# Patient Record
Sex: MALE | Race: WHITE | NOT HISPANIC OR LATINO | URBAN - METROPOLITAN AREA
[De-identification: names, ages, dates, MRNs, and addresses within clinical notes are randomized per-mention and may not be internally consistent; named-entity substitution may affect disease eponyms.]

---

## 2020-10-21 ENCOUNTER — OFFICE VISIT (OUTPATIENT)
Dept: SURGERY | Facility: CLINIC | Age: 60
End: 2020-10-21
Payer: COMMERCIAL

## 2020-10-21 VITALS
SYSTOLIC BLOOD PRESSURE: 140 MMHG | DIASTOLIC BLOOD PRESSURE: 80 MMHG | HEIGHT: 71 IN | WEIGHT: 185 LBS | BODY MASS INDEX: 25.9 KG/M2

## 2020-10-21 DIAGNOSIS — K64.8 INTERNAL HEMORRHOID, BLEEDING: Primary | ICD-10-CM

## 2020-10-21 PROCEDURE — 99204 OFFICE O/P NEW MOD 45 MIN: CPT | Mod: 25 | Performed by: SURGERY

## 2020-10-21 PROCEDURE — 46500 INJECTION INTO HEMORRHOID(S): CPT | Performed by: SURGERY

## 2020-10-21 RX ORDER — BUTYROSPERMUM PARKII(SHEA BUTTER), SIMMONDSIA CHINENSIS (JOJOBA) SEED OIL, ALOE BARBADENSIS LEAF EXTRACT .01; 1; 3.5 G/100G; G/100G; G/100G
250 LIQUID TOPICAL 2 TIMES DAILY
COMMUNITY

## 2020-10-21 RX ORDER — FERROUS SULFATE 325(65) MG
65 TABLET ORAL
COMMUNITY

## 2020-10-21 RX ORDER — VIT C/E/ZN/COPPR/LUTEIN/ZEAXAN 250MG-90MG
CAPSULE ORAL DAILY
COMMUNITY

## 2020-10-21 NOTE — PROCEDURES
Procedures  The patient had large friable internal hemorrhoids that required office based therapy.  Verbal consent was obtained.  No prep was necessary.  First a digital rectal exam and then anoscopy was performed.  Injection sclerotherapy was then performed using 5% phenol in olive oil through an 18 gauge spinal needle.  The patient tolerated it well and was given instructions to return as needed.

## 2020-10-21 NOTE — PATIENT INSTRUCTIONS
Injection Sclerotherapy for Hemorrhoids    Treatment Overview    Injection sclerotherapy is a medical procedure used to treat bulging or bleeding internal hemorrhoids. This fixative procedure uses a chemical that scars the tissues and cuts off the hemorrhoids' blood supply.    The doctor injects a chemical called “phenol” mixed in olive oil into the veins within a hemorrhoid. This chemical causes the vein to harden and the hemorrhoid tissue to die. A scar forms in place of the hemorrhoid on the wall of the anal canal. The scar tissue, which is firm and thick, holds nearby tissue and veins in place so they don't bulge into the anal canal.    What To Expect After Treatment    ? a small to moderate amount of bleeding may occur for up to 7 days  ? you may feel an ache or soreness for 24-48 hours  ? you may feel full or like you have to defecate for 24 hours    Taking Care of Yourself    ? sitting in a warm tub or sitz bath may relieve the aching sensation  ? tylenol or ibuprofen should be adequate to control the discomfort  ? try to maintain a high fiber diet in order to reduce straining with defecation    Adverse Reactions - please call Dr. Webster if any of these occur    ? fever  ? chills  ? aching joints  ? pelvic pain  ? pelvic inflammation  ? rectal inflammation or infection      These reactions are rare and may occur in 1 in 500 cases.  Most are not serious, are self-limiting, and require no medical intervention.

## 2020-10-21 NOTE — PROGRESS NOTES
Chief Complaint:   Chief Complaint   Patient presents with   • Rectal Bleeding     hemorrhoids   .    HPI     Patient is a 59 y.o. male who presents with the following:  Pt has int hems via colonoscopy - few episodes of bleeding - few years ago the bleeding lasted a while - sees Dr. Morris - took iron - bleeding stopped - recently another episode bleed and more anemia    Years ago HGB was 10.9    recemntly 11.7 - back on irone    Bleeding has stopped - today a drop or two    Last scope was 1.5 y ago - told 10y    ANORECTAL SYMPTOM GRID    Timing / Duration - overall 10y on and off - 3y ago bad - this year bad  Bleeding - three weeks recently - every day - can stream into bowl - can drip into bowl - has to wait it out - stop it - no pad - not messing up clothes -   Pain - no sharp pain   External tissue - no  BM Frequency - daily  Dermatitis - no  Chemicals - always wipes -   Moderating Factors - maybe diet - gassy / consstipation  Last Colonoscopy - 1.5 -              Medical History:   Past Medical History:   Diagnosis Date   • Lipid disorder        Surgical History:   History reviewed. No pertinent surgical history.    Social History:   Social History     Socioeconomic History   • Marital status:      Spouse name: Not on file   • Number of children: Not on file   • Years of education: Not on file   • Highest education level: Not on file   Occupational History   • Not on file   Social Needs   • Financial resource strain: Not on file   • Food insecurity     Worry: Not on file     Inability: Not on file   • Transportation needs     Medical: Not on file     Non-medical: Not on file   Tobacco Use   • Smoking status: Never Smoker   • Smokeless tobacco: Never Used   Substance and Sexual Activity   • Alcohol use: Yes   • Drug use: Never   • Sexual activity: Defer   Lifestyle   • Physical activity     Days per week: Not on file     Minutes per session: Not on file   • Stress: Not on file   Relationships   • Social  connections     Talks on phone: Not on file     Gets together: Not on file     Attends Christian service: Not on file     Active member of club or organization: Not on file     Attends meetings of clubs or organizations: Not on file     Relationship status: Not on file   • Intimate partner violence     Fear of current or ex partner: Not on file     Emotionally abused: Not on file     Physically abused: Not on file     Forced sexual activity: Not on file   Other Topics Concern   • Not on file   Social History Narrative   • Not on file       Family History:   History reviewed. No pertinent family history.    Allergies:   Patient has no known allergies.    Current Medications:      Current Outpatient Medications:   •  cyanocobalamin, vitamin B-12, (VITAMIN B-12) 2,500 mcg tablet, sublingual, Place under the tongue daily., Disp: , Rfl:   •  ferrous sulfate 325 mg (65 mg iron) tablet, Take 65 mg by mouth daily with breakfast., Disp: , Rfl:   •  MEN'S MULTI-VITAMIN ORAL, Take by mouth., Disp: , Rfl:   •  saccharomyces boulardii (FLORASTOR) 250 mg capsule, Take 250 mg by mouth 2 (two) times a day., Disp: , Rfl:     Review of Systems  All 14 systems reviewed and the findings are not pertinent to the current problem.    Objective     Vital Signs  Vitals:    10/21/20 1603   BP: 140/80     Body mass index is 25.8 kg/m².      Physicial Exam  General Appearance:  Well developed.  Well nourished.  In no acute distress. Patient was not obese.  Head:  Posture of the head was normal.  Neck:  External features of the neck was normal.  Trachea:  Showed no abnormalities.  Trachea midline.  Extraocular Movements:  Normal.  Pupils:  Reactive to light.   Not deformed.  Oral Cavity:  Mixed dentition was not observed.  Tongue was midline.  Peripheral edema:  Not present.  Genitalia:  Normal.    Anorectal Examination:    No pilonidal sinus was observed.   No pilonidal cyst was observed.   Perianal skin was not erythematous.  No perianal wart  was observed.  No anal fissure was observed.   Anus did not have a fistula.   Anal sphincter tone was normal.    The patient had large, friable, grade three internal hemorrhoids.  Specifics:  Very large hems R post is huge - all injected.    No external anal skin tags were observed.   Anus had no mass.  Rectum:  No rectal abscess was observed.   Rectal prolapse was not observed.   No rectal fistula was observed.   Rectal exam was not tender.   No rectal fluctuance was observed.  Rectal exam showed no mass.   Normal dave-anal skin with no lesions or dermatitis    Other Exam:  Musculoskeletal System:  No gross defecits or defects of the upper or lower extremities.  No cyanosis of the fingers.  Lumbar / Lumbosacral Spine:  Lumbar/lumbosacral spine exhibited no abnormalities.  Bilateral thighs:   Posterior aspect was not swollen.   Posterior aspect showed no induration.   Posterior aspect was not erythematous.   Posterior aspect was not warm.   No mass on the posterior aspect.  Functional Exam:   General/bilateral:  Mobility was not limited.  Neurological:   No confusion was observed.   No disorientation was observed.  Speech:  Normal.  Motor:  No tremor was seen.  Balance:  Normal.  Gait And Stance:  Normal.  Psychiatric:  Mood:  Euthymic.  Affect:  Normal.  Skin:  No clubbing of the fingernails.  Normal upper and lower extremity skin exam.            Problem List Items Addressed This Visit     Internal hemorrhoid, bleeding - Primary     He has intermittent flares of very intense rectal bleeding that goes on for weeks and tends to drop his hemoglobin.  On exam he has very large internal hemorrhoids.  I injected him with phenol today to prevent any future attacks of intense bleeding.                   Sincere Webster MD 10/21/2020 4:41 PM

## 2020-10-21 NOTE — ASSESSMENT & PLAN NOTE
He has intermittent flares of very intense rectal bleeding that goes on for weeks and tends to drop his hemoglobin.  On exam he has very large internal hemorrhoids.  I injected him with phenol today to prevent any future attacks of intense bleeding.

## 2020-10-21 NOTE — LETTER
October 21, 2020     Magi Morris MD  [id number] [facility] [id number]    Patient: Chema Walker  YOB: 1960  Date of Visit: 10/21/2020      Dear Dr. Morris:    Thank you for referring Chema Walker to me for evaluation. Below are my notes for this consultation.    If you have questions, please do not hesitate to call me. I look forward to following your patient along with you.         Sincerely,        Sincere Webster MD        CC: No Recipients  Sincere Webster MD  10/21/2020  4:41 PM  Sign when Signing Visit      Chief Complaint:   Chief Complaint   Patient presents with   • Rectal Bleeding     hemorrhoids   .    HPI     Patient is a 59 y.o. male who presents with the following:  Pt has int hems via colonoscopy - few episodes of bleeding - few years ago the bleeding lasted a while - sees Dr. Morris - took iron - bleeding stopped - recently another episode bleed and more anemia    Years ago HGB was 10.9    recemntly 11.7 - back on irone    Bleeding has stopped - today a drop or two    Last scope was 1.5 y ago - told 10y    ANORECTAL SYMPTOM GRID    Timing / Duration - overall 10y on and off - 3y ago bad - this year bad  Bleeding - three weeks recently - every day - can stream into bowl - can drip into bowl - has to wait it out - stop it - no pad - not messing up clothes -   Pain - no sharp pain   External tissue - no  BM Frequency - daily  Dermatitis - no  Chemicals - always wipes -   Moderating Factors - maybe diet - gassy / consstipation  Last Colonoscopy - 1.5 -              Medical History:   Past Medical History:   Diagnosis Date   • Lipid disorder        Surgical History:   History reviewed. No pertinent surgical history.    Social History:   Social History     Socioeconomic History   • Marital status:      Spouse name: Not on file   • Number of children: Not on file   • Years of education: Not on file   • Highest education level: Not on file    Occupational History   • Not on file   Social Needs   • Financial resource strain: Not on file   • Food insecurity     Worry: Not on file     Inability: Not on file   • Transportation needs     Medical: Not on file     Non-medical: Not on file   Tobacco Use   • Smoking status: Never Smoker   • Smokeless tobacco: Never Used   Substance and Sexual Activity   • Alcohol use: Yes   • Drug use: Never   • Sexual activity: Defer   Lifestyle   • Physical activity     Days per week: Not on file     Minutes per session: Not on file   • Stress: Not on file   Relationships   • Social connections     Talks on phone: Not on file     Gets together: Not on file     Attends Spiritism service: Not on file     Active member of club or organization: Not on file     Attends meetings of clubs or organizations: Not on file     Relationship status: Not on file   • Intimate partner violence     Fear of current or ex partner: Not on file     Emotionally abused: Not on file     Physically abused: Not on file     Forced sexual activity: Not on file   Other Topics Concern   • Not on file   Social History Narrative   • Not on file       Family History:   History reviewed. No pertinent family history.    Allergies:   Patient has no known allergies.    Current Medications:      Current Outpatient Medications:   •  cyanocobalamin, vitamin B-12, (VITAMIN B-12) 2,500 mcg tablet, sublingual, Place under the tongue daily., Disp: , Rfl:   •  ferrous sulfate 325 mg (65 mg iron) tablet, Take 65 mg by mouth daily with breakfast., Disp: , Rfl:   •  MEN'S MULTI-VITAMIN ORAL, Take by mouth., Disp: , Rfl:   •  saccharomyces boulardii (FLORASTOR) 250 mg capsule, Take 250 mg by mouth 2 (two) times a day., Disp: , Rfl:     Review of Systems  All 14 systems reviewed and the findings are not pertinent to the current problem.    Objective     Vital Signs  Vitals:    10/21/20 1603   BP: 140/80     Body mass index is 25.8 kg/m².      Physicial Exam  General  Appearance:  Well developed.  Well nourished.  In no acute distress. Patient was not obese.  Head:  Posture of the head was normal.  Neck:  External features of the neck was normal.  Trachea:  Showed no abnormalities.  Trachea midline.  Extraocular Movements:  Normal.  Pupils:  Reactive to light.   Not deformed.  Oral Cavity:  Mixed dentition was not observed.  Tongue was midline.  Peripheral edema:  Not present.  Genitalia:  Normal.    Anorectal Examination:    No pilonidal sinus was observed.   No pilonidal cyst was observed.   Perianal skin was not erythematous.  No perianal wart was observed.  No anal fissure was observed.   Anus did not have a fistula.   Anal sphincter tone was normal.    The patient had large, friable, grade three internal hemorrhoids.  Specifics:  Very large hems R post is huge - all injected.    No external anal skin tags were observed.   Anus had no mass.  Rectum:  No rectal abscess was observed.   Rectal prolapse was not observed.   No rectal fistula was observed.   Rectal exam was not tender.   No rectal fluctuance was observed.  Rectal exam showed no mass.   Normal dave-anal skin with no lesions or dermatitis    Other Exam:  Musculoskeletal System:  No gross defecits or defects of the upper or lower extremities.  No cyanosis of the fingers.  Lumbar / Lumbosacral Spine:  Lumbar/lumbosacral spine exhibited no abnormalities.  Bilateral thighs:   Posterior aspect was not swollen.   Posterior aspect showed no induration.   Posterior aspect was not erythematous.   Posterior aspect was not warm.   No mass on the posterior aspect.  Functional Exam:   General/bilateral:  Mobility was not limited.  Neurological:   No confusion was observed.   No disorientation was observed.  Speech:  Normal.  Motor:  No tremor was seen.  Balance:  Normal.  Gait And Stance:  Normal.  Psychiatric:  Mood:  Euthymic.  Affect:  Normal.  Skin:  No clubbing of the fingernails.  Normal upper and lower extremity skin  exam.            Problem List Items Addressed This Visit     Internal hemorrhoid, bleeding - Primary     He has intermittent flares of very intense rectal bleeding that goes on for weeks and tends to drop his hemoglobin.  On exam he has very large internal hemorrhoids.  I injected him with phenol today to prevent any future attacks of intense bleeding.                   Sincere Webster MD 10/21/2020 4:41 PM           Sincere Webster MD  10/21/2020  4:41 PM  Sign when Signing Visit  Procedures  The patient had large friable internal hemorrhoids that required office based therapy.  Verbal consent was obtained.  No prep was necessary.  First a digital rectal exam and then anoscopy was performed.  Injection sclerotherapy was then performed using 5% phenol in olive oil through an 18 gauge spinal needle.  The patient tolerated it well and was given instructions to return as needed.

## 2021-04-08 DIAGNOSIS — Z23 ENCOUNTER FOR IMMUNIZATION: ICD-10-CM

## 2021-08-18 ENCOUNTER — TRANSCRIBE ORDERS (OUTPATIENT)
Dept: SCHEDULING | Age: 61
End: 2021-08-18

## 2021-08-18 DIAGNOSIS — R91.1 SOLITARY PULMONARY NODULE: Primary | ICD-10-CM

## 2025-08-21 ENCOUNTER — OFFICE VISIT (OUTPATIENT)
Dept: SURGERY | Facility: CLINIC | Age: 65
End: 2025-08-21
Payer: COMMERCIAL

## 2025-08-21 VITALS — HEIGHT: 71 IN | BODY MASS INDEX: 25.9 KG/M2 | WEIGHT: 185 LBS

## 2025-08-21 DIAGNOSIS — K64.8 INTERNAL HEMORRHOID, BLEEDING: Primary | ICD-10-CM

## 2025-08-21 PROCEDURE — 3008F BODY MASS INDEX DOCD: CPT

## 2025-08-21 PROCEDURE — 99204 OFFICE O/P NEW MOD 45 MIN: CPT

## 2025-09-02 ENCOUNTER — TELEPHONE (OUTPATIENT)
Dept: OPERATING ROOM | Facility: HOSPITAL | Age: 65
End: 2025-09-02
Payer: COMMERCIAL

## 2025-09-05 ENCOUNTER — TELEPHONE (OUTPATIENT)
Dept: OPERATING ROOM | Facility: HOSPITAL | Age: 65
End: 2025-09-05
Payer: COMMERCIAL